# Patient Record
(demographics unavailable — no encounter records)

---

## 2024-10-17 NOTE — HISTORY OF PRESENT ILLNESS
[FreeTextEntry1] : f/up [de-identified] : Pt is a 61 yo F with PMHx of hypothyroidism, T2DM, HTN, HLD presenting for follow up and wonders if she still needs losartan as her pressures are often wnl in office. Patient states that recently she has noticed that her left neck feels big and hard and sometimes pulsates. Wonders if it is related to her medication. Patient also notes occasional leg heaviness and occasionally feeling funny in the middle of the night, wonders if it is her thyroid.

## 2024-10-17 NOTE — HISTORY OF PRESENT ILLNESS
[FreeTextEntry1] : f/up [de-identified] : Pt is a 61 yo F with PMHx of hypothyroidism, T2DM, HTN, HLD presenting for follow up and wonders if she still needs losartan as her pressures are often wnl in office. Patient states that recently she has noticed that her left neck feels big and hard and sometimes pulsates. Wonders if it is related to her medication. Patient also notes occasional leg heaviness and occasionally feeling funny in the middle of the night, wonders if it is her thyroid.

## 2024-10-17 NOTE — REVIEW OF SYSTEMS
[Fever] : no fever [Chills] : no chills [Fatigue] : no fatigue [Chest Pain] : no chest pain [Palpitations] : no palpitations [Shortness Of Breath] : no shortness of breath [Wheezing] : no wheezing [Cough] : no cough [Abdominal Pain] : no abdominal pain [Nausea] : no nausea [Constipation] : no constipation [Vomiting] : no vomiting [Joint Pain] : no joint pain [Muscle Pain] : no muscle pain [FreeTextEntry4] : left sided neck tightness and occasional pulsation [de-identified] : varicose veins, spider veins bilateral LE

## 2024-10-17 NOTE — REVIEW OF SYSTEMS
[Fever] : no fever [Chills] : no chills [Fatigue] : no fatigue [Chest Pain] : no chest pain [Palpitations] : no palpitations [Shortness Of Breath] : no shortness of breath [Wheezing] : no wheezing [Cough] : no cough [Abdominal Pain] : no abdominal pain [Nausea] : no nausea [Constipation] : no constipation [Vomiting] : no vomiting [Joint Pain] : no joint pain [Muscle Pain] : no muscle pain [FreeTextEntry4] : left sided neck tightness and occasional pulsation [de-identified] : varicose veins, spider veins bilateral LE

## 2024-10-17 NOTE — PHYSICAL EXAM
[No Acute Distress] : no acute distress [Well Nourished] : well nourished [Well Developed] : well developed [Well-Appearing] : well-appearing [No Lymphadenopathy] : no lymphadenopathy [Supple] : supple [No Respiratory Distress] : no respiratory distress  [No Accessory Muscle Use] : no accessory muscle use [Clear to Auscultation] : lungs were clear to auscultation bilaterally [Normal Rate] : normal rate  [Regular Rhythm] : with a regular rhythm [Normal S1, S2] : normal S1 and S2 [Soft] : abdomen soft [Non Tender] : non-tender [Non-distended] : non-distended [Normal Bowel Sounds] : normal bowel sounds [Normal Affect] : the affect was normal [Normal Insight/Judgement] : insight and judgment were intact [Thyroid Normal, No Nodules] : the thyroid was normal and there were no nodules present [de-identified] : vircose and spider veins bilateral LE

## 2024-10-17 NOTE — HISTORY OF PRESENT ILLNESS
[FreeTextEntry1] : f/up [de-identified] : Pt is a 61 yo F with PMHx of hypothyroidism, T2DM, HTN, HLD presenting for follow up and wonders if she still needs losartan as her pressures are often wnl in office. Patient states that recently she has noticed that her left neck feels big and hard and sometimes pulsates. Wonders if it is related to her medication. Patient also notes occasional leg heaviness and occasionally feeling funny in the middle of the night, wonders if it is her thyroid.

## 2024-10-17 NOTE — PHYSICAL EXAM
[No Acute Distress] : no acute distress [Well Nourished] : well nourished [Well Developed] : well developed [Well-Appearing] : well-appearing [No Lymphadenopathy] : no lymphadenopathy [Supple] : supple [No Respiratory Distress] : no respiratory distress  [No Accessory Muscle Use] : no accessory muscle use [Clear to Auscultation] : lungs were clear to auscultation bilaterally [Normal Rate] : normal rate  [Regular Rhythm] : with a regular rhythm [Normal S1, S2] : normal S1 and S2 [Soft] : abdomen soft [Non Tender] : non-tender [Non-distended] : non-distended [Normal Bowel Sounds] : normal bowel sounds [Normal Affect] : the affect was normal [Normal Insight/Judgement] : insight and judgment were intact [Thyroid Normal, No Nodules] : the thyroid was normal and there were no nodules present [de-identified] : vircose and spider veins bilateral LE

## 2024-10-17 NOTE — PHYSICAL EXAM
[No Acute Distress] : no acute distress [Well Nourished] : well nourished [Well Developed] : well developed [Well-Appearing] : well-appearing [No Lymphadenopathy] : no lymphadenopathy [Supple] : supple [No Respiratory Distress] : no respiratory distress  [No Accessory Muscle Use] : no accessory muscle use [Clear to Auscultation] : lungs were clear to auscultation bilaterally [Normal Rate] : normal rate  [Regular Rhythm] : with a regular rhythm [Normal S1, S2] : normal S1 and S2 [Soft] : abdomen soft [Non Tender] : non-tender [Non-distended] : non-distended [Normal Bowel Sounds] : normal bowel sounds [Normal Affect] : the affect was normal [Normal Insight/Judgement] : insight and judgment were intact [Thyroid Normal, No Nodules] : the thyroid was normal and there were no nodules present [de-identified] : vircose and spider veins bilateral LE

## 2024-10-17 NOTE — REVIEW OF SYSTEMS
[Fever] : no fever [Chills] : no chills [Fatigue] : no fatigue [Chest Pain] : no chest pain [Palpitations] : no palpitations [Shortness Of Breath] : no shortness of breath [Wheezing] : no wheezing [Cough] : no cough [Abdominal Pain] : no abdominal pain [Nausea] : no nausea [Constipation] : no constipation [Vomiting] : no vomiting [Joint Pain] : no joint pain [Muscle Pain] : no muscle pain [FreeTextEntry4] : left sided neck tightness and occasional pulsation [de-identified] : varicose veins, spider veins bilateral LE

## 2025-02-06 NOTE — REVIEW OF SYSTEMS
[Fever] : no fever [Chills] : no chills [Fatigue] : no fatigue [Chest Pain] : no chest pain [Palpitations] : no palpitations [Shortness Of Breath] : no shortness of breath [Wheezing] : no wheezing [Cough] : no cough [Abdominal Pain] : no abdominal pain [Nausea] : no nausea [Constipation] : no constipation [Vomiting] : no vomiting [Joint Pain] : no joint pain [Muscle Pain] : no muscle pain

## 2025-02-06 NOTE — HISTORY OF PRESENT ILLNESS
[FreeTextEntry1] : f/p [de-identified] : Pt is a 63 yo F with PMHx of hypothyroidism, T2DM, HTN, HLD presenting for follow up of DM2. POCT a1c 6.9. Patient states that she is feeling well, avoiding greasy foods.   Pt states that she was recently seen by ophthalmology. Using reading glasses.

## 2025-02-06 NOTE — PHYSICAL EXAM
[No Acute Distress] : no acute distress [Well Nourished] : well nourished [Well Developed] : well developed [Well-Appearing] : well-appearing [No Respiratory Distress] : no respiratory distress  [No Accessory Muscle Use] : no accessory muscle use [Clear to Auscultation] : lungs were clear to auscultation bilaterally [Normal Rate] : normal rate  [Regular Rhythm] : with a regular rhythm [Normal S1, S2] : normal S1 and S2 [Soft] : abdomen soft [Non Tender] : non-tender [Non-distended] : non-distended [Normal Bowel Sounds] : normal bowel sounds [Normal Affect] : the affect was normal [Normal Insight/Judgement] : insight and judgment were intact [Right Foot Was Examined] : Right foot ~C was examined [Left Foot Was Examined] : left foot ~C was examined [] : Left  foot is normal [No Lymphadenopathy] : no lymphadenopathy [Supple] : supple

## 2025-04-28 NOTE — PHYSICAL EXAM
[No Acute Distress] : no acute distress [Well Nourished] : well nourished [Well Developed] : well developed [Well-Appearing] : well-appearing [Normal Oropharynx] : the oropharynx was normal [No Lymphadenopathy] : no lymphadenopathy [Supple] : supple [No Respiratory Distress] : no respiratory distress  [No Accessory Muscle Use] : no accessory muscle use [Clear to Auscultation] : lungs were clear to auscultation bilaterally [Normal Rate] : normal rate  [Regular Rhythm] : with a regular rhythm [Normal S1, S2] : normal S1 and S2 [Soft] : abdomen soft [Non Tender] : non-tender [Non-distended] : non-distended [Normal Bowel Sounds] : normal bowel sounds [Normal Affect] : the affect was normal [Normal Insight/Judgement] : insight and judgment were intact [Normal Outer Ear/Nose] : the outer ears and nose were normal in appearance

## 2025-04-29 NOTE — HEALTH RISK ASSESSMENT
[Good] : ~his/her~  mood as  good [No] : No [0] : 2) Feeling down, depressed, or hopeless: Not at all (0) [PHQ-2 Negative - No further assessment needed] : PHQ-2 Negative - No further assessment needed [Never] : Never [NO] : No [Patient reported mammogram was normal] : Patient reported mammogram was normal [Patient reported PAP Smear was normal] : Patient reported PAP Smear was normal [With Significant Other] : lives with significant other [Employed] : employed [] :  [Feels Safe at Home] : Feels safe at home [Fully functional (bathing, dressing, toileting, transferring, walking, feeding)] : Fully functional (bathing, dressing, toileting, transferring, walking, feeding) [Fully functional (using the telephone, shopping, preparing meals, housekeeping, doing laundry, using] : Fully functional and needs no help or supervision to perform IADLs (using the telephone, shopping, preparing meals, housekeeping, doing laundry, using transportation, managing medications and managing finances) [[id number]] : 0 [MammogramDate] : 052/24 [MammogramComments] : BI-RADS 2- repeat 1 year [PapSmearDate] : 12/2021 [PapSmearComments] : repeat 12/2026 [ColonoscopyDate] : 10/2016 [ColonoscopyComments] : due 2026 [de-identified] : lives with  and son [de-identified] : , light housekeeping

## 2025-04-29 NOTE — HISTORY OF PRESENT ILLNESS
[FreeTextEntry1] : CPE [de-identified] : Pt is a 63 yo F with PMHx of hypothyroidism, T2DM, HTN, HLD presenting for CPE. Pt notes allergies worse recently.

## 2025-04-29 NOTE — HISTORY OF PRESENT ILLNESS
[FreeTextEntry1] : CPE [de-identified] : Pt is a 63 yo F with PMHx of hypothyroidism, T2DM, HTN, HLD presenting for CPE. Pt notes allergies worse recently.

## 2025-04-29 NOTE — HEALTH RISK ASSESSMENT
[Good] : ~his/her~  mood as  good [No] : No [0] : 2) Feeling down, depressed, or hopeless: Not at all (0) [PHQ-2 Negative - No further assessment needed] : PHQ-2 Negative - No further assessment needed [Never] : Never [NO] : No [Patient reported mammogram was normal] : Patient reported mammogram was normal [Patient reported PAP Smear was normal] : Patient reported PAP Smear was normal [With Significant Other] : lives with significant other [Employed] : employed [] :  [Feels Safe at Home] : Feels safe at home [Fully functional (bathing, dressing, toileting, transferring, walking, feeding)] : Fully functional (bathing, dressing, toileting, transferring, walking, feeding) [Fully functional (using the telephone, shopping, preparing meals, housekeeping, doing laundry, using] : Fully functional and needs no help or supervision to perform IADLs (using the telephone, shopping, preparing meals, housekeeping, doing laundry, using transportation, managing medications and managing finances) [[id number]] : 0 [MammogramDate] : 052/24 [MammogramComments] : BI-RADS 2- repeat 1 year [PapSmearDate] : 12/2021 [PapSmearComments] : repeat 12/2026 [ColonoscopyDate] : 10/2016 [ColonoscopyComments] : due 2026 [de-identified] : lives with  and son [de-identified] : , light housekeeping